# Patient Record
Sex: MALE | URBAN - METROPOLITAN AREA
[De-identification: names, ages, dates, MRNs, and addresses within clinical notes are randomized per-mention and may not be internally consistent; named-entity substitution may affect disease eponyms.]

---

## 2021-08-18 ENCOUNTER — TESTING ONLY (OUTPATIENT)
Dept: URBAN - METROPOLITAN AREA CLINIC 4 | Facility: CLINIC | Age: 36
End: 2021-08-18

## 2021-08-18 DIAGNOSIS — H53.8 OTHER VISUAL DISTURBANCES: Primary | ICD-10-CM

## 2021-08-18 ASSESSMENT — INTRAOCULAR PRESSURE
OD: 14
OS: 16

## 2021-08-18 ASSESSMENT — KERATOMETRY
OS: 41.25
OD: 41.38

## 2021-08-18 NOTE — IMPRESSION/PLAN
Impression: Other visual disturbances: H53.8. Plan: Laser vision correction not indicated due to high refractive error at upper limits of parameters/hyperopia OU. Recommend referral for formal refractive lens replacement/cataract evaluation in future.